# Patient Record
Sex: FEMALE | Race: WHITE
[De-identification: names, ages, dates, MRNs, and addresses within clinical notes are randomized per-mention and may not be internally consistent; named-entity substitution may affect disease eponyms.]

---

## 2021-12-02 ENCOUNTER — HOSPITAL ENCOUNTER (EMERGENCY)
Dept: HOSPITAL 75 - ER FS | Age: 36
LOS: 1 days | Discharge: LEFT BEFORE DISCHARGE | End: 2021-12-03
Payer: COMMERCIAL

## 2021-12-02 DIAGNOSIS — T39.1X2A: Primary | ICD-10-CM

## 2021-12-02 DIAGNOSIS — E87.6: ICD-10-CM

## 2021-12-02 DIAGNOSIS — Z20.822: ICD-10-CM
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
## 2021-12-02 LAB
ALBUMIN SERPL-MCNC: 4.4 GM/DL (ref 3.2–4.5)
ALP SERPL-CCNC: 85 U/L (ref 40–136)
ALT SERPL-CCNC: 13 U/L (ref 0–55)
APAP SERPL-MCNC: 62 UG/ML (ref 10–30)
APTT PPP: YELLOW S
BACTERIA #/AREA URNS HPF: (no result) /HPF
BARBITURATES UR QL: NEGATIVE
BASOPHILS # BLD AUTO: 0.1 10^3/UL (ref 0–0.1)
BASOPHILS NFR BLD AUTO: 0 % (ref 0–10)
BENZODIAZ UR QL SCN: NEGATIVE
BILIRUB SERPL-MCNC: 0.8 MG/DL (ref 0.1–1)
BILIRUB UR QL STRIP: NEGATIVE
BUN/CREAT SERPL: 14
CALCIUM SERPL-MCNC: 9.3 MG/DL (ref 8.5–10.1)
CHLORIDE SERPL-SCNC: 104 MMOL/L (ref 98–107)
CO2 SERPL-SCNC: 17 MMOL/L (ref 21–32)
COCAINE UR QL: NEGATIVE
CREAT SERPL-MCNC: 0.7 MG/DL (ref 0.6–1.3)
EOSINOPHIL # BLD AUTO: 0.3 10^3/UL (ref 0–0.3)
EOSINOPHIL NFR BLD AUTO: 4 % (ref 0–10)
FIBRINOGEN PPP-MCNC: CLEAR MG/DL
GFR SERPLBLD BASED ON 1.73 SQ M-ARVRAT: 94 ML/MIN
GLUCOSE SERPL-MCNC: 145 MG/DL (ref 70–105)
GLUCOSE UR STRIP-MCNC: NEGATIVE MG/DL
HCG UR QL: NEGATIVE
HCT VFR BLD CALC: 38 % (ref 35–52)
HGB BLD-MCNC: 12.3 G/DL (ref 11.5–16)
KETONES UR QL STRIP: NEGATIVE
LEUKOCYTE ESTERASE UR QL STRIP: NEGATIVE
LYMPHOCYTES # BLD AUTO: 3.3 X 10^3 (ref 1–4)
LYMPHOCYTES NFR BLD AUTO: 38 % (ref 12–44)
MANUAL DIFFERENTIAL PERFORMED BLD QL: NO
MCH RBC QN AUTO: 28 PG (ref 25–34)
MCHC RBC AUTO-ENTMCNC: 33 G/DL (ref 32–36)
MCV RBC AUTO: 85 FL (ref 80–99)
METHADONE UR QL SCN: NEGATIVE
METHAMPHETAMINE SCREEN URINE S: NEGATIVE
MONOCYTES # BLD AUTO: 0.8 X 10^3 (ref 0–1)
MONOCYTES NFR BLD AUTO: 10 % (ref 0–12)
NEUTROPHILS # BLD AUTO: 4.1 X 10^3 (ref 1.8–7.8)
NEUTROPHILS NFR BLD AUTO: 48 % (ref 42–75)
NITRITE UR QL STRIP: POSITIVE
OPIATES UR QL SCN: NEGATIVE
OXYCODONE UR QL: NEGATIVE
PH UR STRIP: 6 [PH] (ref 5–9)
PLATELET # BLD: 343 10^3/UL (ref 130–400)
PMV BLD AUTO: 9.3 FL (ref 9–12.2)
POTASSIUM SERPL-SCNC: 3.1 MMOL/L (ref 3.6–5)
PROPOXYPH UR QL: NEGATIVE
PROT SERPL-MCNC: 7.5 GM/DL (ref 6.4–8.2)
PROT UR QL STRIP: NEGATIVE
RBC #/AREA URNS HPF: (no result) /HPF
SALICYLATES SERPL-MCNC: < 0.3 MG/DL (ref 5–20)
SODIUM SERPL-SCNC: 137 MMOL/L (ref 135–145)
SP GR UR STRIP: 1.01 (ref 1.02–1.02)
SQUAMOUS #/AREA URNS HPF: (no result) /HPF
TRICYCLICS UR QL SCN: NEGATIVE
WBC # BLD AUTO: 8.6 10^3/UL (ref 4.3–11)
WBC #/AREA URNS HPF: (no result) /HPF

## 2021-12-02 PROCEDURE — 81000 URINALYSIS NONAUTO W/SCOPE: CPT

## 2021-12-02 PROCEDURE — 99283 EMERGENCY DEPT VISIT LOW MDM: CPT

## 2021-12-02 PROCEDURE — 87636 SARSCOV2 & INF A&B AMP PRB: CPT

## 2021-12-02 PROCEDURE — 36415 COLL VENOUS BLD VENIPUNCTURE: CPT

## 2021-12-02 PROCEDURE — 87077 CULTURE AEROBIC IDENTIFY: CPT

## 2021-12-02 PROCEDURE — 93005 ELECTROCARDIOGRAM TRACING: CPT

## 2021-12-02 PROCEDURE — 80320 DRUG SCREEN QUANTALCOHOLS: CPT

## 2021-12-02 PROCEDURE — 87186 SC STD MICRODIL/AGAR DIL: CPT

## 2021-12-02 PROCEDURE — 80053 COMPREHEN METABOLIC PANEL: CPT

## 2021-12-02 PROCEDURE — 80329 ANALGESICS NON-OPIOID 1 OR 2: CPT

## 2021-12-02 PROCEDURE — 84703 CHORIONIC GONADOTROPIN ASSAY: CPT

## 2021-12-02 PROCEDURE — 87088 URINE BACTERIA CULTURE: CPT

## 2021-12-02 PROCEDURE — 85025 COMPLETE CBC W/AUTO DIFF WBC: CPT

## 2021-12-02 PROCEDURE — 83735 ASSAY OF MAGNESIUM: CPT

## 2021-12-02 PROCEDURE — 80306 DRUG TEST PRSMV INSTRMNT: CPT

## 2021-12-02 NOTE — ED GENERAL
General


Stated Complaint:  OD ON NYQUIL





History of Present Illness


Date Seen by Provider:  Dec 2, 2021


Time Seen by Provider:  17:18


Initial Comments


Patient presenting to the emergency department for evaluation of an intentional 

overdose at 4 PM she took 24 capsules of NyQuil severe cold and flu which 

appears to be acetaminophen 325 mg dextromethorphan 10 mg doxylamine 6.25 mg and

phenylephrine 5 mg.  Patient also took for ounces of Robitussin which is 

dextromethorphan 30 mg and doxylamine 12.5 mg per 20 mL.  I spoke to the poison 

center regarding patient's overdose and they recommended a Tylenol level at 4 

hours and also doing 3 EKGs spreadout every 2 hours and if the patient's Tylenol

level is greater than 7504 hours it should be rechecked in 6 hours.  The 

dextromethorphan level she took is 6 mg/kg and he stated the toxic level is 7.5 

mg/kg.  Patient is initially hyperventilating and anxious however she calmed 

very quickly.   Patient is in no acute distress after her initial panic episode 

with normal vital signs.


 (LARISSA BOURNE DO)





Allergies and Home Medications


Allergies


Coded Allergies:  


     No Known Drug Allergies (Unverified , 5/18/10)





Patient Home Medication List


Home Medication List Reviewed:  Yes


 (LARISSA BOURNE DO)


Acetaminophen with Codeine (Acetaminophen-Cod #3 Tablet) 1 Each Tablet, 1-2 TAB 

PO Q4H PRN for MODERATE TO SEVERE PAIN


   Prescribed by: ARACELI BORRERO on 7/28/16 1052


Docusate Sodium (Docusate Sodium) 100 Mg Capsule, 100 MG PO BID


   Prescribed by: ARACELI BORRERO on 7/28/16 1052


Doxylamine Succinate (Unisom) 25 Mg Tablet, 25 MG PO HS, (Reported)


   Entered as Reported by: IFTIKHAR CARRERA on 7/10/16 1601


Ferrous Sulfate (Iron) 325 Mg Tablet, 1 TAB PO DAILY, (Reported)


   Entered as Reported by: STACY MEYERS on 7/12/12 0338


Ibuprofen (Ibuprofen) 600 Mg Tablet, 600 MG PO Q6H


   Prescribed by: ARACELI BORRERO on 7/28/16 1052


Prenatal Vits W-Ca,Fe,Fa(<1MG) (Prenatal) 1 Each Tablet, 1 EACH PO DAILY, 

(Reported)


   Entered as Reported by: PAULA WILSON on 7/9/12 0005


Pyridoxine HCl (Vitamin B-6) 50 Mg Tablet, 50 MG PO HS, (Reported)


   Entered as Reported by: IFTIKHAR CARRERA on 7/10/16 1601





Review of Systems


Review of Systems


Constitutional:  no symptoms reported


EENTM:  no symptoms reported


Cardiovascular:  no symptoms reported


Gastrointestinal:  no symptoms reported


Genitourinary:  no symptoms reported


Musculoskeletal:  no symptoms reported


Skin:  no symptoms reported


Psychiatric/Neurological:  Anxiety, Depressed (LARISSA BOURNE DO)





All Other Systems Reviewed


Negative Unless Noted:  Yes


 (LARISSA BOURNE DO)





Past Medical-Social-Family Hx


Immunizations Up To Date


Tetanus Booster (TDap):  Unknown


PED Vaccines UTD:  No


 (LARISSA BOURNE DO)





Seasonal Allergies


Seasonal Allergies:  No


 (LARISSA BOURNE DO)





Past Medical History


Reproductive Disorders:  No


Female Reproductive Disorders:  Denies


Sexually Transmitted Disease:  No


HIV/AIDS:  No


Hearing Impairment:  Hard of Hearing


Adverse Reaction/Blood Tranf:  No


 (LARISSA BOURNE DO)





Family Medical History





Asthma


  19 MOTHER


Hypertension


  19 MOTHER





Physical Exam


Vital Signs





Vital Signs - First Documented








 12/2/21 12/3/21





 18:46 05:49


 


Temp 37.0 


 


Pulse 70 


 


Resp 40 


 


B/P (MAP) 109/74 (86) 


 


Pulse Ox  100


 


O2 Delivery Room Air 








 (FLYNN WILKERSON MD)


Vital Signs


Capillary Refill :  


 (LRAISSA BOURNE DO)


Height, Weight, BMI


Height: 5'8.00"


Weight: 202lbs. 0.0oz. 91.152214on; 30.7 BMI


Method:Stated


General Appearance:  No Apparent Distress, WD/WN


HEENT:  PERRL/EOMI


Neck:  Supple


Respiratory:  Lungs Clear, No Respiratory Distress


Cardiovascular:  Regular Rate, Rhythm


Gastrointestinal:  Non Tender, Soft


Back:  Normal Inspection


Extremity:  Normal Capillary Refill, No Pedal Edema


Neurologic/Psychiatric:  Alert, Oriented x3, No Motor/Sensory Deficits


Skin:  Warm/Dry (LARISSA BOURNE DO)





Progress/Results/Core Measures


Suspected Sepsis


SIRS


Temperature: 


Pulse:  


Respiratory Rate: 


 


Laboratory Tests


12/2/21 16:58: White Blood Count 8.6


Blood Pressure  / 


Mean: 


 


Laboratory Tests


12/2/21 16:58: 


Creatinine 0.70, Platelet Count 343, Total Bilirubin 0.8


 (LARISSA BOURNE DO)





Results/Orders


Lab Results





Laboratory Tests








Test


 12/2/21


16:58 12/2/21


18:30 12/2/21


20:08 Range/Units


 


 


White Blood Count


 8.6 


 


 


 4.3-11.0


10^3/uL


 


Red Blood Count


 4.48 


 


 


 3.80-5.11


10^6/uL


 


Hemoglobin 12.3    11.5-16.0  g/dL


 


Hematocrit 38    35-52  %


 


Mean Corpuscular Volume 85    80-99  fL


 


Mean Corpuscular Hemoglobin 28    25-34  pg


 


Mean Corpuscular Hemoglobin


Concent 33 


 


 


 32-36  g/dL





 


Red Cell Distribution Width 14.6 H   10.0-14.5  %


 


Platelet Count


 343 


 


 


 130-400


10^3/uL


 


Mean Platelet Volume 9.3    9.0-12.2  fL


 


Immature Granulocyte % (Auto) 0     %


 


Neutrophils (%) (Auto) 48    42-75  %


 


Lymphocytes (%) (Auto) 38    12-44  %


 


Monocytes (%) (Auto) 10    0-12  %


 


Eosinophils (%) (Auto) 4    0-10  %


 


Basophils (%) (Auto) 0    0-10  %


 


Neutrophils # (Auto) 4.1    1.8-7.8  X 10^3


 


Lymphocytes # (Auto) 3.3    1.0-4.0  X 10^3


 


Monocytes # (Auto) 0.8    0.0-1.0  X 10^3


 


Eosinophils # (Auto)


 0.3 


 


 


 0.0-0.3


10^3/uL


 


Basophils # (Auto)


 0.1 


 


 


 0.0-0.1


10^3/uL


 


Immature Granulocyte # (Auto)


 0.0 


 


 


 0.0-0.1


10^3/uL


 


Urine Color YELLOW     


 


Urine Clarity CLEAR     


 


Urine pH 6.0    5-9  


 


Urine Specific Gravity 1.010 L   1.016-1.022  


 


Urine Protein NEGATIVE    NEGATIVE  


 


Urine Glucose (UA) NEGATIVE    NEGATIVE  


 


Urine Ketones NEGATIVE    NEGATIVE  


 


Urine Nitrite POSITIVE H   NEGATIVE  


 


Urine Bilirubin NEGATIVE    NEGATIVE  


 


Urine Urobilinogen 0.2    < = 1.0  MG/DL


 


Urine Leukocyte Esterase NEGATIVE    NEGATIVE  


 


Urine RBC (Auto) NEGATIVE    NEGATIVE  


 


Urine RBC NONE     /HPF


 


Urine WBC 0-2     /HPF


 


Urine Squamous Epithelial


Cells 0-2 


 


 


  /HPF





 


Urine Crystals NONE     /LPF


 


Urine Bacteria LARGE H    /HPF


 


Urine Casts NONE     /LPF


 


Urine Mucus NEGATIVE     /LPF


 


Urine Culture Indicated YES     


 


Urine Pregnancy Test NEGATIVE    NEGATIVE  


 


Sodium Level 137    135-145  MMOL/L


 


Potassium Level 3.1 L   3.6-5.0  MMOL/L


 


Chloride Level 104      MMOL/L


 


Carbon Dioxide Level 17 L   21-32  MMOL/L


 


Anion Gap 16 H   5-14  MMOL/L


 


Blood Urea Nitrogen 10    7-18  MG/DL


 


Creatinine


 0.70 


 


 


 0.60-1.30


MG/DL


 


Estimat Glomerular Filtration


Rate 94 


 


 


  





 


BUN/Creatinine Ratio 14     


 


Glucose Level 145 H     MG/DL


 


Calcium Level 9.3    8.5-10.1  MG/DL


 


Corrected Calcium 9.0    8.5-10.1  MG/DL


 


Magnesium Level 2.2    1.6-2.4  MG/DL


 


Total Bilirubin 0.8    0.1-1.0  MG/DL


 


Aspartate Amino Transf


(AST/SGOT) 19 


 


 


 5-34  U/L





 


Alanine Aminotransferase


(ALT/SGPT) 13 


 


 


 0-55  U/L





 


Alkaline Phosphatase 85      U/L


 


Total Protein 7.5    6.4-8.2  GM/DL


 


Albumin 4.4    3.2-4.5  GM/DL


 


Salicylates Level < 0.3 L   5.0-20.0  MG/DL


 


Urine Opiates Screen NEGATIVE    NEGATIVE  


 


Urine Oxycodone Screen NEGATIVE    NEGATIVE  


 


Urine Methadone Screen NEGATIVE    NEGATIVE  


 


Urine Propoxyphene Screen NEGATIVE    NEGATIVE  


 


Acetaminophen Level 62 *H  63 #*H 10-30  UG/ML


 


Urine Barbiturates Screen NEGATIVE    NEGATIVE  


 


Ur Tricyclic Antidepressants


Screen NEGATIVE 


 


 


 NEGATIVE  





 


Urine Phencyclidine Screen NEGATIVE    NEGATIVE  


 


Urine Amphetamines Screen NEGATIVE    NEGATIVE  


 


Urine Methamphetamines Screen NEGATIVE    NEGATIVE  


 


Urine Benzodiazepines Screen NEGATIVE    NEGATIVE  


 


Urine Cocaine Screen NEGATIVE    NEGATIVE  


 


Urine Cannabinoids Screen NEGATIVE    NEGATIVE  


 


Serum Alcohol < 10    <10  MG/DL


 


SARS-CoV-2 RNA (RT-PCR)  Not Detected   Not Detecte  





 (FLYNN WILKERSON MD)


Vital Signs/I&O











 12/2/21 12/3/21





 18:46 05:49


 


Temp 37.0 


 


Pulse 70 54


 


Resp 40 14


 


B/P (MAP) 109/74 (86) 108/73


 


Pulse Ox  100


 


O2 Delivery Room Air Room Air














 12/3/21





 00:00


 


Intake Total 1050 ml


 


Balance 1050 ml








 (FLYNN WILKERSON MD)


Vital Signs/I&O


Capillary Refill :  


 (LARISSA BOURNE DO)


Progress Note :  


Progress Note


The poison center recommended an observation period of 6 hours and if she is 

asymptomatic she then could be evaluated by psychiatry for transfer.  We will 

continue to observe and follow the poison center's recommendations.





Patient's second Tylenol level at 4 hours is well below the threshold for 

N-acetylcysteine. Patient continues to be asymptomatic in the emergency 

department with normal vital signs and I would consider her medically cleared 

from my perspective for psychiatric evaluation. Patient's potassium has been 

replaced.





0700:  Plan is for placement for now, will transfer care at time of shift change

to Dr. Wilkerson pending final disposition plan.


 (LARISSA BOURNE DO)


Progress Note :  


Progress Note


I received this patient in sign out with plans to await placement for the 

patient.  At 9:30 AM, the patient and  were stating they would like to 

leave and drive straight to [place] to see if they can get in faster.  I said 

this is against our recommendations, and that we recommend they continue to wait

here for placement.  The patient is hungry, and unfortunately we do not have 

full meals in our emergency department.  I contacted the mental health screener 

that screened the patient originally.  They stated that they are unwilling to 

make the patient involuntary, and she will be voluntary.  Because of this, we do

have to discharge the patient AGAINST MEDICAL ADVICE.  The  is stating he

will drive her directly to [place] to get help, which I think would be 

appropriate at this time if that is their wishes.  She was then discharged in 

stable condition with strict return precautions.


 (FLYNN WILKERSON MD)





Departure


Impression





   Primary Impression:  


   Hypokalemia


   Additional Impressions:  


   Suicidal ideation


   Intentional overdose of drug in tablet form


Disposition:  07 AGAINST MEDICAL ADVICE


Condition:  Stable





Departure-Patient Inst.


Decision time for Depature:  09:32


 (FLYNN WILKERSON MD)


Referrals:  


VIRGIL FANG DO (PCP/Family)


Primary Care Physician


Patient Instructions:  Depression, Adult (DC)





Add. Discharge Instructions:  


Please drive directly to the [place] emergency department so that you can 

continue to get help.











LARISSA BOURNE DO              Dec 2, 2021 17:23


FLYNN WILKERSON MD           Dec 3, 2021 09:32

## 2021-12-03 VITALS — SYSTOLIC BLOOD PRESSURE: 118 MMHG | DIASTOLIC BLOOD PRESSURE: 62 MMHG
